# Patient Record
(demographics unavailable — no encounter records)

---

## 2024-10-21 NOTE — REASON FOR VISIT
[FreeTextEntry1] : =========================================================================================== Diagnostic Tests: -------------------------------------------------------------- EC24: sinus bradycardia at 50 bpm, first degree AV block, RBBB, far right axis deviation.  23: sinus rhythm, possible old anterior MI.  22: sinus bradycardia at 54 bpm, otherwise normal ECG.  22: sinus rhythm, low voltage QRS.  22: atrial fibrillation with SVR, low voltage QRS. 07/15/19: sinus bradycardia at 49 bpm, old septal MI.  18: sinus bradycardia, possible anteroseptal MI.  18: NSR, normal ECG.  16: NSR, normal ECG.  04/01/15: sinus bradycardia, PVC, no change from prior. 04/28/10: NSR, possible septal infarct, no change from prior.  -------------------------------------------------------------- Echo: 10/21/24: EF 71%, mild LVH.  23: EF 66%, mild TR, PASP 38 mmHg.  10/10/22: EF 69%, mild TR, PASP 42 mmHg.  22: EF 40%, mid ventricular takotsubo CM, mild-to-moderate TR, PASP 44 mmHg.  19: EF 73%, PASP 40mmHg.  18: EF 62%, mild TR, PASP 45mmHg.  17: EF 64%, grade I diastolic dysfunction, trace MR/TR, PASP 51mmHg. 04/21/15: 65%, grade II diastolic dysfunction, mild TR, PASP 50mmHg 12: EF 68%, mild TR, PASP 54mmHg 08: EF 74%, trace MR/TR/PI, diastolic dysfunction 10/27/05: EF 65%, normal -------------------------------------------------------------- Stress:  08/19/15: at outside lab: exercise MPI: normal perfusion and EF 06/01/10: exercise stress echo: EF 70%, normal wall motion and PA pressures post 7 METS 10/27/05: positive posterior and inferior ischemia, EF 70% -------------------------------------------------------------- Cath:  22: [place] [facility] [place] mild diffuse CAD, no mention of RCA stent.   10/31/05: PCI to mid RCA, mid LAD 40%, OM1 50%, EF 60% -------------------------------------------------------------- Carotid: 03/23/15: sono: normal b/l. -------------------------------------------------------------- Abdominal Aorta: 19: sono: no AAA, + moderate atherosclerosis.  -------------------------------------------------------------- Monitors: 24 to 24: BardyDx extended Holter: HR 39/55/120, no AF, no SVT, 10 pauses (3.1 seconds), 2nd degree type I AV block, no VT, <1% APCs, <1% PVCs, no patient triggered events.

## 2024-10-21 NOTE — ASSESSMENT
[FreeTextEntry1] : ======================================================================================= 1. CAD: s/p PCI mid RCA with moderate residual disease, NYHA 1, CCS 0, s/p cath 05/01/22 (for takotsubo CM) with mild CAD 05/01/22:  - continue aggressive medical management   - restart aspirin 81mg p qd  - will follow LV function with serial echocardiograms   2. HTN: BP not at ACC/AHA 2017 guideline target: + marked HTN: will avoid HR slowing medications given resting bradycardia: off all medications today:    - restart valsartan//25mg po qd  - if BP remains above target next visit will up titrate anti-HTN regimen   3. Pulmonary hypertension: likely secondary to tobacco use and diastolic dysfunction, PASP 42mmHg (10/10/22):  - discussed with patient importance of smoking cessation (rash on Chantix), now down to 5-6 cigarettes per day  - will follow PA pressures with serial echocardiograms (ordered today)   4. Dyslipidemia:  (07/08/24):  - restart rosuvastatin 40mg po daily  - discussed with patient therapeutic lifestyle changes to improve lipid metabolism  - has a difficult time with lab draws secondary to minimal venous access  7. Smoking: was not able to tolerate Chantix (rash) and found Wellbutrin ineffective:  - previously referred to tobacco cessation counselling program but she is no longer interested  8. 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 Paroxysmal atrial fibrillation: [id number] Score 4: single episode on 05/02/22 during takotsubo CM:  - continue off systemic anticoagulation  - will continue to survey for recurrence of AF  10. Takotsubo cardiomyopathy (05/01/22): secondary to pain from fractured rib post thoracotomy: resolved on echocardiogram from 10/10/22:  - will manage conservatively at this time  11: Sinus bradycardia, marked: s/p 07/08/24 to 07/14/24: BardyDx extended Holter: HR 39/55/120, no AF, no SVT, 10 pauses (3.1 seconds), 2nd degree type I AV block, no VT, <1% APCs, <1% PVCs, no patient triggered events:   - continue off heart rate slowing agents   I spent > 45 minutes of total time on this visit, including time spent face-to-face and non-face-to-face.  During this time, I took a relevant history and examined the patient.  I reviewed relevant portions of the medical record and formulated a differential diagnosis and plan.  I explained the relevant cardiac diagnoses to the patient, as well as the work up and management plan.  I answered all questions related to the patient's cardiac conditions.

## 2024-10-21 NOTE — HISTORY OF PRESENT ILLNESS
[FreeTextEntry1] : Ms. aL presents for follow up and management of squamous cell lung cancer right lower lobe stage IIIa (s/p resection 04/27/22), takotsubo cardiomyopathy (05/01/22), paroxysmal atrial fibrillation, HTN, dyslipidemia, and CAD s/p PCI RCA '05.  She was diagnosed with lung cancer on 02/14/22 when a CT chest from the ED at [facility] revealed a right lower lobe lung mass.  She was evaluated by a hematologist, Jostin Rankin MD, and was diagnosed with stage IIIA ([id number]) moderately differentiated squamous cell carcinoma R lower lung; NGS neg, PDL <1%. She underwent right lower lobectomy with Jesús Hall MD on 04/27/22. She was being followed by another cardiologist, Doris Tomlinson MD.  Her hospital course was complicated by chest pain and mildly positive troponins for which she underwent invasive coronary angiography on 05/01/22 which revealed mild diffuse CAD (no mention was made of her previous RCA stent).   She was diagnosed with stress (takotsubo) cardiomyopathy.  In addition, she had a brief paroxysm of atrial fibrillation on 05/02/22.  She was treated with Eliquis 2.5mg po bid which she has since discontinued.  She had a CT chest/abdomen/pelvis on 08/11/22 which did not reveal evidence of recurrent disease or metastasis.  She is being managed conservatively without plan for adjuvant chemotherapy or RT.  In October 2023, she was admitted to [facility] s/p fall (possible syncope after receiving the news about her granddaughter passing away) resulting in a right femoral neck fracture.  [place] [facility] [place] on [place]23[place].  She was evaluated by another cardiologist, Alva Johnson MD.  She was evaluated in the ED at [facility] on 12/19/23 with hematochezia. Of note, her main support is her daughter who, unfortunately, has bipolar disorder.  She wore an extended Holter monitor from 07/08/24 to 07/14/24 which revealed HR 39/55/120, no AF, no SVT, 10 pauses (3.1 seconds), 2nd degree type I AV block, no VT, <1% APCs, <1% PVCs, and no patient triggered events.  Today, 10/21/24, she had an echocardiogram which revealed an EF of 71% and mild LVH.  Of note, she lost her granddaughter and brother in the last couple of months.  Once again, she is off all medications.